# Patient Record
Sex: FEMALE | Race: WHITE | NOT HISPANIC OR LATINO | Employment: FULL TIME | URBAN - METROPOLITAN AREA
[De-identification: names, ages, dates, MRNs, and addresses within clinical notes are randomized per-mention and may not be internally consistent; named-entity substitution may affect disease eponyms.]

---

## 2017-01-09 ENCOUNTER — RADIANT APPOINTMENT (OUTPATIENT)
Dept: GENERAL RADIOLOGY | Facility: CLINIC | Age: 32
End: 2017-01-09
Attending: NURSE PRACTITIONER
Payer: COMMERCIAL

## 2017-01-09 ENCOUNTER — OFFICE VISIT (OUTPATIENT)
Dept: FAMILY MEDICINE | Facility: CLINIC | Age: 32
End: 2017-01-09
Payer: COMMERCIAL

## 2017-01-09 VITALS
TEMPERATURE: 98 F | DIASTOLIC BLOOD PRESSURE: 80 MMHG | BODY MASS INDEX: 39.92 KG/M2 | SYSTOLIC BLOOD PRESSURE: 123 MMHG | WEIGHT: 198 LBS | HEART RATE: 88 BPM | RESPIRATION RATE: 16 BRPM | HEIGHT: 59 IN

## 2017-01-09 DIAGNOSIS — S89.90XA KNEE INJURY: ICD-10-CM

## 2017-01-09 DIAGNOSIS — S89.92XA LEFT KNEE INJURY, INITIAL ENCOUNTER: Primary | ICD-10-CM

## 2017-01-09 PROCEDURE — 99203 OFFICE O/P NEW LOW 30 MIN: CPT | Performed by: NURSE PRACTITIONER

## 2017-01-09 PROCEDURE — 73562 X-RAY EXAM OF KNEE 3: CPT | Mod: LT

## 2017-01-09 NOTE — PROGRESS NOTES
"  SUBJECTIVE:                                                    Addie Fox is a 31 year old female who presents to clinic today for the following health issues:      Musculoskeletal problem/pain      Duration: today around noon     Description  Location: left knee     Intensity:  severe    Accompanying signs and symptoms: tingling    History  Previous similar problem: no   Previous evaluation:  none    Precipitating or alleviating factors:  Trauma or overuse: YES- fall  Aggravating factors include: none    Therapies tried and outcome: ice         Problem list and histories reviewed & adjusted, as indicated.    Additional history:  Foot ti  Slipped on ice getting out of her vehicle. Felt her left knee \"pop\".  Was able to go back to work to finish her day. Works at a computer.  Knee hurts and very painful to bear weight.  Foot tingles.   Here with         ROS:  C: NEGATIVE for fever, chills, change in weight  R: NEGATIVE for significant cough or SOB  CV: NEGATIVE for chest pain, palpitations or peripheral edema       OBJECTIVE:                                                    /80 mmHg  Pulse 88  Temp(Src) 98  F (36.7  C) (Tympanic)  Resp 16  Ht 4' 11\" (1.499 m)  Wt 198 lb (89.812 kg)  BMI 39.97 kg/m2  LMP 12/12/2016 (Exact Date)  Body mass index is 39.97 kg/(m^2).   GENERAL: healthy, alert, well nourished, well hydrated, sitting in wheel chair   RESP: lungs clear to auscultation - no rales, no rhonchi, no wheezes  CV: regular rates and rhythm, normal S1 S2, no S3 or S4 and no murmur, no click or rub   LEFT KNEE; Limited ROM,  No effusion. No lesions. Tender over medial knee   LEFT LEG: neurovascular signs intact     LEFT KNEE XRAY: negative to my interpretation      ASSESSMENT/PLAN:                                                        ICD-10-CM    1. Left knee injury, initial encounter S89.92XA        Discussed the injury. May expect some swelling to develop. Encourage elevation of knee and " icing. Has crutches at home to use if needed  Advil 400-600 mg every 4-6 hours for pain  Follow up if not improving as expected.      MARCUS Mccracken [facility]

## 2017-01-09 NOTE — MR AVS SNAPSHOT
"              After Visit Summary   2017    Addie Fox    MRN: [id number]           Patient Information     Date Of Birth          1985        Visit Information        Provider Department      2017 6:30 PM Lynn Gray APRN CNP [facility]        Today's Diagnoses     Knee injury    -  1        Follow-ups after your visit        Who to contact     If you have questions or need follow up information about today's clinic visit or your schedule please contact [facility] directly at [phone].  Normal or non-critical lab and imaging results will be communicated to you by [employer], letter or phone within 4 business days after the clinic has received the results. If you do not hear from us within 7 days, please contact the clinic through [employer] or phone. If you have a critical or abnormal lab result, we will notify you by phone as soon as possible.  Submit refill requests through [employer] or call your pharmacy and they will forward the refill request to us. Please allow 3 business days for your refill to be completed.          Additional Information About Your Visit        MyChart Information     [employer] lets you send messages to your doctor, view your test results, renew your prescriptions, schedule appointments and more. To sign up, go to [email or web address] . Click on \"Log in\" on the left side of the screen, which will take you to the Welcome page. Then click on \"Sign up Now\" on the right side of the page.     You will be asked to enter the access code listed below, as well as some personal information. Please follow the directions to create your username and password.     Your access code is: [email or web address]  Expires: 2017  7:05 PM     Your access code will  in 90 days. If you need help or a new code, please call your [facility] or [phone].        Care EveryWhere ID     This is your Care EveryWhere ID. This could be used by other organizations " "to access your [facility] medical records  [id number]        Your Vitals Were     Pulse Temperature Respirations Height BMI (Body Mass Index) Last Period    88 98  F (36.7  C) (Tympanic) 16 4' 11\" (1.499 m) 39.97 kg/m2 12/12/2016 (Exact Date)       Blood Pressure from Last 3 Encounters:   01/09/17 123/80    Weight from Last 3 Encounters:   01/09/17 198 lb (89.812 kg)              Today, you had the following     No orders found for display       Primary Care Provider    None Specified       No primary provider on file.        Thank you!     Thank you for choosing [facility]  for your care. Our goal is always to provide you with excellent care. Hearing back from our patients is one way we can continue to improve our services. Please take a few minutes to complete the written survey that you may receive in the mail after your visit with us. Thank you!             Your Updated Medication List - Protect others around you: Learn how to safely use, store and throw away your medicines at www.disposemymeds.org.      Notice  As of 1/9/2017  7:05 PM    You have not been prescribed any medications.      "

## 2017-01-09 NOTE — NURSING NOTE
"Chief Complaint   Patient presents with     Knee Pain       Initial /80 mmHg  Pulse 88  Temp(Src) 98  F (36.7  C) (Tympanic)  Resp 16  Ht 4' 11\" (1.499 m)  Wt 198 lb (89.812 kg)  BMI 39.97 kg/m2  LMP 12/12/2016 (Exact Date) Estimated body mass index is 39.97 kg/(m^2) as calculated from the following:    Height as of this encounter: 4' 11\" (1.499 m).    Weight as of this encounter: 198 lb (89.812 kg).  BP completed using cuff size: gilma Conway, CMA      "

## 2017-11-09 ENCOUNTER — ANESTHESIA - HEALTHEAST (OUTPATIENT)
Dept: SURGERY | Facility: HOSPITAL | Age: 32
End: 2017-11-09

## 2017-11-09 ASSESSMENT — MIFFLIN-ST. JEOR: SCORE: 1558.75

## 2017-11-10 ENCOUNTER — SURGERY - HEALTHEAST (OUTPATIENT)
Dept: SURGERY | Facility: HOSPITAL | Age: 32
End: 2017-11-10

## 2017-11-10 ASSESSMENT — MIFFLIN-ST. JEOR
SCORE: 1573.26
SCORE: 1567.82

## 2018-05-13 ENCOUNTER — APPOINTMENT (OUTPATIENT)
Dept: GENERAL RADIOLOGY | Facility: CLINIC | Age: 33
End: 2018-05-13
Attending: EMERGENCY MEDICINE
Payer: COMMERCIAL

## 2018-05-13 ENCOUNTER — HOSPITAL ENCOUNTER (EMERGENCY)
Facility: CLINIC | Age: 33
Discharge: HOME | End: 2018-05-13
Attending: EMERGENCY MEDICINE | Admitting: EMERGENCY MEDICINE
Payer: COMMERCIAL

## 2018-05-13 VITALS
HEART RATE: 84 BPM | DIASTOLIC BLOOD PRESSURE: 90 MMHG | WEIGHT: 200 LBS | TEMPERATURE: 98.4 F | RESPIRATION RATE: 16 BRPM | HEIGHT: 59 IN | SYSTOLIC BLOOD PRESSURE: 146 MMHG | BODY MASS INDEX: 40.32 KG/M2 | OXYGEN SATURATION: 98 %

## 2018-05-13 DIAGNOSIS — S80.11XA CONTUSION OF RIGHT LOWER EXTREMITY, INITIAL ENCOUNTER: ICD-10-CM

## 2018-05-13 DIAGNOSIS — S93.401A SPRAIN OF RIGHT ANKLE, UNSPECIFIED LIGAMENT, INITIAL ENCOUNTER: ICD-10-CM

## 2018-05-13 PROCEDURE — 25000132 ZZH RX MED GY IP 250 OP 250 PS 637: Performed by: EMERGENCY MEDICINE

## 2018-05-13 PROCEDURE — 73610 X-RAY EXAM OF ANKLE: CPT | Mod: RT

## 2018-05-13 PROCEDURE — 99283 EMERGENCY DEPT VISIT LOW MDM: CPT

## 2018-05-13 RX ORDER — IBUPROFEN 600 MG/1
600 TABLET, FILM COATED ORAL ONCE
Status: COMPLETED | OUTPATIENT
Start: 2018-05-13 | End: 2018-05-13

## 2018-05-13 RX ADMIN — IBUPROFEN 600 MG: 600 TABLET ORAL at 11:50

## 2018-05-13 ASSESSMENT — ENCOUNTER SYMPTOMS: CONSTITUTIONAL NEGATIVE: 1

## 2018-05-13 NOTE — ED PROVIDER NOTES
"  History     Chief Complaint:  Right Ankle Pain    HPI   Addie Fox is a 33 year old female who presents with right ankle pain. Today the patient was getting out of her car when she twisted her right ankle and fell. The ankle has been painful and non weightbearingShe also notes discomfort to right knee and right foot. . She denies head trauma or additional pain with the fall. She denies past medical problems and is otherwise normally healthy. She has not taken anything yet for pain.     Allergies:  Hazelnuts [Nuts]    Medications:    The patient is not currently taking any prescribed medications.    Past Medical History:    The patient denies any relevant past medical history.    Past Surgical History:    History reviewed. No pertinent past surgical history.    Family History:    The patient denies any relevant family medical history.    Social History:  The patient was accompanied to the ED by her .  Smoking Status: never  Smokeless Tobacco: never  Alcohol Use: no  The patient lives at home with her  and two kids.  She is an .     Marital Status:   [2]      Review of Systems   Constitutional: Negative.    Musculoskeletal:        Right ankle pain   All other systems reviewed and are negative.      Physical Exam   First Vitals:  BP: 146/90  Pulse: 84  Temp: 98.4  F (36.9  C)  Resp: 16  Height: 149.9 cm (4' 11\")  Weight: 90.7 kg (200 lb)  SpO2: 98 %    Physical Exam  General: Resting comfortably on the gurney  Head:  The scalp, face, and head appear normal  Neck:  Normal range of motion  MS:  Right leg maximum tenderness is right lateral ankle which has mild to moderate tenderness. Mild tenderness of   right knee and foot. CMS intact. Knee:     Full ROM. No effusion. Stable in all directions. Diffuse mild tenderness. Superficial abrasion.     Ankle: No medial tenderness. Some interosseal tenderness.   Neuro:  Speech is normal and fluent. Moves all 4 extremities. "   Psych:  Awake. Alert.  Normal affect.      Appropriate interactions    Emergency Department Course   Imaging:  Radiographic findings were communicated with the patient who voiced understanding of the findings.  XR Ankle Right G/E 3 views:   Three views right ankle. No fracture or dislocation. No significant soft tissue swelling. Mortise joint appears symmetric. As per radiology.     Procedures:  PROCEDURE: Splint Placement.    Air splint was applied to the right lower extremity and after placement I checked and adjusted the fit to ensure proper positioning.  The patient was more comfortable with the splint in place.  Sensation and circulation are intact after splint placement.    Interventions:  1150 Advil, 600 mg, Oral    Emergency Department Course:  Nursing notes and vitals reviewed. I performed an exam of the patient as documented above.     Medicine administered as documented above.     The patient was sent for an X-ray of the right ankle while in the emergency department, findings above.     1218 I rechecked the patient and discussed the results of her workup thus far.     An air splint was placed, see details above. The patient has an ace wrap and crutches for assistance.     Findings and plan explained to the Patient. Patient discharged home with instructions regarding supportive care, medications, and reasons to return. The importance of close follow-up was reviewed.     I personally reviewed the imaging results with the Patient and answered all related questions prior to discharge.       Impression & Plan    Medical Decision Making:  The patient had an inversion injury to her right ankle and fell to the ground.  She comes in complaining of pain from her right knee to her right foot although mainly localized in the right lateral ankle.  She has tenderness and swelling laterally and mildly interosseous.  Her ankle x-ray is negative.  She has some mild tenderness elsewhere in her leg including her knee but  has no significant tenderness or localized tenderness so I don't think she needs further imaging.  I suspect this is more bruised and can be watched over time and follow-up if not getting better.  She can weight-bear as tolerated with her air splint.  She will follow-up with her primary as needed.    Diagnosis:    ICD-10-CM    1. Sprain of right ankle, unspecified ligament, initial encounter S93.401A    2. Contusion of right lower extremity, initial encounter S80.11XA      Disposition:  discharged to home    IFeliz, am serving as a scribe on 5/13/2018 at 12:39 PM to personally document services performed by Nan Cedeno MD based on my observations and the provider's statements to me.     Feliz Miller  5/13/2018    EMERGENCY DEPARTMENT       Nan Cedeno MD  05/13/18 [id number]

## 2018-05-13 NOTE — ED AVS SNAPSHOT
Emergency Department    [address] [facility] [address]    Phone:  [phone]    Fax:  [phone]                                       Addie Fox   MRN: [id number]    Department:   Emergency Department   Date of Visit:  5/13/2018           Patient Information     Date Of Birth          1985        Your diagnoses for this visit were:     Sprain of right ankle, unspecified ligament, initial encounter     Contusion of right lower extremity, initial encounter        You were seen by Nan Cedeno MD.      Follow-up Information     Follow up with Delilah Tang MD.    Specialty:  Family Practice    Why:  As needed    Contact information:    [facility]  [address] [id number] [phone]          Discharge Instructions       Discharge Instructions  Ankle Sprain    An ankle sprain is a stretching or tearing of a ligament around your ankle joint. In most cases, we recommend resting the ankle for about 3 days, followed by return to activity. Some severe sprains need longer periods of rest, or can require a cast or boot to immobilize them.    Generally, every Emergency Department visit should have a follow-up clinic visit with either a primary or a specialty clinic/provider. Please follow-up as instructed by your emergency provider today.    Return to the Emergency Department if:    Your pain is much worse, or if there is pain in a new area.    Your foot or leg becomes pale, cool, blue, or numb or tingling.    There is anything concerning to you about how your ankle looks.    Any splint or device is feeling too tight, causing pain, or rubbing into your skin.    Follow-up with your provider:    As recommended by your emergency provider.    If your ankle is not back to normal within about 1 week.    If you are involved in significant athletic activities.        Treatment:    Apply ice your injured area for 15 minutes at a time, at least 3 times a  day for the first 1-2 days. Use a cloth between the ice bag and your skin to prevent frostbite.     Do not sleep with an ice pack or heating pad on, since this can cause burns or skin injury.    Raise the injured area above the level of your heart as much as possible in the first 1-2 days.    Pain medications -- You may take a pain medication such as Tylenol  (acetaminophen), Advil , Nuprin  (ibuprofen) or Aleve  (naproxen).    Splint. We often give a stirrup-shaped ankle splint to support your ankle and prevent it from turning again. Wear this all the time for the first 3-5 days, and then as directed by your provider.    Crutches. If you cannot put weight on the ankle without a lot of pain, we recommend crutches. You can put as much weight on the ankle as possible without severe pain.     Compression. An elastic bandage (Ace  wrap) can help with pain and swelling. Remove this at least twice a day, and leave it off for several hours if you develop swelling of the foot.     Exercises.  Movements, like rotating the foot in circles, should be started when swelling improves.   If you were given a prescription for medicine here today, be sure to read all of the information (including the package insert) that comes with your prescription.  This will include important information about the medicine, its side effects, and any warnings that you need to know about.  The pharmacist who fills the prescription can provide more information and answer questions you may have about the medicine.  If you have questions or concerns that the pharmacist cannot address, please call or return to the Emergency Department.  Remember that you can always come back to the Emergency Department if you are not able to see your regular provider in the amount of time listed above, if you get any new symptoms, or if there is anything that worries you.    24 Hour Appointment Hotline       To make an appointment at any [facility], call  [phone] ([phone]). If you don't have a family doctor or clinic, we will help you find one. [facility] clinics are conveniently located to serve the needs of you and your family.             Review of your medicines      Notice     You have not been prescribed any medications.            Procedures and tests performed during your visit     XR Ankle Right G/E 3 Views      Orders Needing Specimen Collection     None      Pending Results     No orders found from 5/11/2018 to 5/14/2018.            Pending Culture Results     No orders found from 5/11/2018 to 5/14/2018.            Pending Results Instructions     If you had any lab results that were not finalized at the time of your Discharge, you can call the ED Lab Result RN at [phone]. You will be contacted by this team for any positive Lab results or changes in treatment. The nurses are available 7 days a week from 10A to 6:30P.  You can leave a message 24 hours per day and they will return your call.        Test Results From Your Hospital Stay        5/13/2018 12:23 PM      Narrative     ANKLE RIGHT THREE OR MORE VIEWS May 13, 2018 12:05 PM     HISTORY: Fell, pain.         Impression     IMPRESSION: Three views right ankle. No fracture or dislocation. No  significant soft tissue swelling. Mortise joint appears symmetric.    ANTONIO KEENAN MD                Clinical Quality Measure: Blood Pressure Screening     Your blood pressure was checked while you were in the emergency department today. The last reading we obtained was  BP: 146/90 . Please read the guidelines below about what these numbers mean and what you should do about them.  If your systolic blood pressure (the top number) is less than 120 and your diastolic blood pressure (the bottom number) is less than 80, then your blood pressure is normal. There is nothing more that you need to do about it.  If your systolic blood pressure (the top number) is 120-139 or your diastolic blood pressure (the  "bottom number) is 80-89, your blood pressure may be higher than it should be. You should have your blood pressure rechecked within a year by a primary care provider.  If your systolic blood pressure (the top number) is 140 or greater or your diastolic blood pressure (the bottom number) is 90 or greater, you may have high blood pressure. High blood pressure is treatable, but if left untreated over time it can put you at risk for heart attack, stroke, or kidney failure. You should have your blood pressure rechecked by a primary care provider within the next 4 weeks.  If your provider in the emergency department today gave you specific instructions to follow-up with your doctor or provider even sooner than that, you should follow that instruction and not wait for up to 4 weeks for your follow-up visit.        Thank you for choosing [facility]       Thank you for choosing [facility] for your care. Our goal is always to provide you with excellent care. Hearing back from our patients is one way we can continue to improve our services. Please take a few minutes to complete the written survey that you may receive in the mail after you visit with us. Thank you!        [employer] Information     [employer] lets you send messages to your doctor, view your test results, renew your prescriptions, schedule appointments and more. To sign up, go to [email or web address] . Click on \"Log in\" on the left side of the screen, which will take you to the Welcome page. Then click on \"Sign up Now\" on the right side of the page.     You will be asked to enter the access code listed below, as well as some personal information. Please follow the directions to create your username and password.     Your access code is: [email or web address]  Expires: 2018 12:46 PM     Your access code will  in 90 days. If you need help or a new code, please call your [facility] clinic or [phone].        Care EveryWhere ID     This is your Care EveryWhere ID. This " could be used by other organizations to access your [facility] medical records  [id number]        Equal Access to Services     AMILCAR ULLOA : Hadii krista Rodriguez, alexis titus, catrachita peña, july myers. So [facility] [phone].    ATENCIÓN: Si habla español, tiene a kyle disposición servicios gratuitos de asistencia lingüística. Llame al [phone].    We comply with applicable federal civil rights laws and [place] laws. We do not discriminate on the basis of race, color, national origin, age, disability, sex, sexual orientation, or gender identity.            After Visit Summary       This is your record. Keep this with you and show to your community pharmacist(s) and doctor(s) at your next visit.

## 2018-05-13 NOTE — DISCHARGE INSTRUCTIONS
Discharge Instructions  Ankle Sprain    An ankle sprain is a stretching or tearing of a ligament around your ankle joint. In most cases, we recommend resting the ankle for about 3 days, followed by return to activity. Some severe sprains need longer periods of rest, or can require a cast or boot to immobilize them.    Generally, every Emergency Department visit should have a follow-up clinic visit with either a primary or a specialty clinic/provider. Please follow-up as instructed by your emergency provider today.    Return to the Emergency Department if:    Your pain is much worse, or if there is pain in a new area.    Your foot or leg becomes pale, cool, blue, or numb or tingling.    There is anything concerning to you about how your ankle looks.    Any splint or device is feeling too tight, causing pain, or rubbing into your skin.    Follow-up with your provider:    As recommended by your emergency provider.    If your ankle is not back to normal within about 1 week.    If you are involved in significant athletic activities.        Treatment:    Apply ice your injured area for 15 minutes at a time, at least 3 times a day for the first 1-2 days. Use a cloth between the ice bag and your skin to prevent frostbite.     Do not sleep with an ice pack or heating pad on, since this can cause burns or skin injury.    Raise the injured area above the level of your heart as much as possible in the first 1-2 days.    Pain medications -- You may take a pain medication such as Tylenol  (acetaminophen), Advil , Nuprin  (ibuprofen) or Aleve  (naproxen).    Splint. We often give a stirrup-shaped ankle splint to support your ankle and prevent it from turning again. Wear this all the time for the first 3-5 days, and then as directed by your provider.    Crutches. If you cannot put weight on the ankle without a lot of pain, we recommend crutches. You can put as much weight on the ankle as possible without severe pain.      Compression. An elastic bandage (Ace  wrap) can help with pain and swelling. Remove this at least twice a day, and leave it off for several hours if you develop swelling of the foot.     Exercises.  Movements, like rotating the foot in circles, should be started when swelling improves.   If you were given a prescription for medicine here today, be sure to read all of the information (including the package insert) that comes with your prescription.  This will include important information about the medicine, its side effects, and any warnings that you need to know about.  The pharmacist who fills the prescription can provide more information and answer questions you may have about the medicine.  If you have questions or concerns that the pharmacist cannot address, please call or return to the Emergency Department.  Remember that you can always come back to the Emergency Department if you are not able to see your regular provider in the amount of time listed above, if you get any new symptoms, or if there is anything that worries you.

## 2018-05-13 NOTE — ED AVS SNAPSHOT
Emergency Department    [address] [place] [address]    Phone:  [phone]    Fax:  [phone]                                       Addie Fox   MRN: [id number]    Department:   Emergency Department   Date of Visit:  5/13/2018           After Visit Summary Signature Page     I have received my discharge instructions, and my questions have been answered. I have discussed any challenges I see with this plan with the nurse or doctor.    ..........................................................................................................................................  Patient/Patient Representative Signature      ..........................................................................................................................................  Patient Representative Print Name and Relationship to Patient    ..................................................               ................................................  Date                                            Time    ..........................................................................................................................................  Reviewed by Signature/Title    ...................................................              ..............................................  Date                                                            Time

## 2020-02-03 ENCOUNTER — RECORDS - HEALTHEAST (OUTPATIENT)
Dept: LAB | Facility: CLINIC | Age: 35
End: 2020-02-03

## 2020-02-03 LAB
ANION GAP SERPL CALCULATED.3IONS-SCNC: 14 MMOL/L (ref 5–18)
BUN SERPL-MCNC: 12 MG/DL (ref 8–22)
CALCIUM SERPL-MCNC: 9.4 MG/DL (ref 8.5–10.5)
CHLORIDE BLD-SCNC: 97 MMOL/L (ref 98–107)
CHOLEST SERPL-MCNC: 245 MG/DL
CO2 SERPL-SCNC: 29 MMOL/L (ref 22–31)
CREAT SERPL-MCNC: 0.64 MG/DL (ref 0.6–1.1)
FASTING STATUS PATIENT QL REPORTED: ABNORMAL
GFR SERPL CREATININE-BSD FRML MDRD: >60 ML/MIN/1.73M2
GLUCOSE BLD-MCNC: 136 MG/DL (ref 70–125)
HDLC SERPL-MCNC: 57 MG/DL
LDLC SERPL CALC-MCNC: 148 MG/DL
POTASSIUM BLD-SCNC: 3.2 MMOL/L (ref 3.5–5)
SODIUM SERPL-SCNC: 140 MMOL/L (ref 136–145)
TRIGL SERPL-MCNC: 201 MG/DL
TSH SERPL DL<=0.005 MIU/L-ACNC: 1.05 UIU/ML (ref 0.3–5)

## 2021-02-05 ENCOUNTER — RECORDS - HEALTHEAST (OUTPATIENT)
Dept: LAB | Facility: CLINIC | Age: 36
End: 2021-02-05

## 2021-02-08 LAB — BACTERIA SPEC CULT: ABNORMAL

## 2021-04-21 ENCOUNTER — RECORDS - HEALTHEAST (OUTPATIENT)
Dept: LAB | Facility: CLINIC | Age: 36
End: 2021-04-21

## 2021-04-21 LAB
ANION GAP SERPL CALCULATED.3IONS-SCNC: 11 MMOL/L (ref 5–18)
BUN SERPL-MCNC: 10 MG/DL (ref 8–22)
CALCIUM SERPL-MCNC: 8.8 MG/DL (ref 8.5–10.5)
CHLORIDE BLD-SCNC: 104 MMOL/L (ref 98–107)
CHOLEST SERPL-MCNC: 258 MG/DL
CO2 SERPL-SCNC: 27 MMOL/L (ref 22–31)
CREAT SERPL-MCNC: 0.66 MG/DL (ref 0.6–1.1)
FASTING STATUS PATIENT QL REPORTED: ABNORMAL
GFR SERPL CREATININE-BSD FRML MDRD: >60 ML/MIN/1.73M2
GLUCOSE BLD-MCNC: 98 MG/DL (ref 70–125)
HDLC SERPL-MCNC: 37 MG/DL
LDLC SERPL CALC-MCNC: 155 MG/DL
POTASSIUM BLD-SCNC: 3.9 MMOL/L (ref 3.5–5)
SODIUM SERPL-SCNC: 142 MMOL/L (ref 136–145)
TRIGL SERPL-MCNC: 328 MG/DL

## 2021-04-26 ENCOUNTER — SURGERY - HEALTHEAST (OUTPATIENT)
Dept: SURGERY | Facility: CLINIC | Age: 36
End: 2021-04-26

## 2021-04-26 ENCOUNTER — OFFICE VISIT - HEALTHEAST (OUTPATIENT)
Dept: SURGERY | Facility: CLINIC | Age: 36
End: 2021-04-26

## 2021-04-26 DIAGNOSIS — D17.30 LIPOMA OF SKIN AND SUBCUTANEOUS TISSUE: ICD-10-CM

## 2021-04-26 ASSESSMENT — MIFFLIN-ST. JEOR: SCORE: 1481.07

## 2021-04-27 ENCOUNTER — AMBULATORY - HEALTHEAST (OUTPATIENT)
Dept: SURGERY | Facility: AMBULATORY SURGERY CENTER | Age: 36
End: 2021-04-27

## 2021-04-27 ENCOUNTER — COMMUNICATION - HEALTHEAST (OUTPATIENT)
Dept: OTOLARYNGOLOGY | Facility: CLINIC | Age: 36
End: 2021-04-27

## 2021-04-27 DIAGNOSIS — Z11.59 ENCOUNTER FOR SCREENING FOR OTHER VIRAL DISEASES: ICD-10-CM

## 2021-05-03 ENCOUNTER — COMMUNICATION - HEALTHEAST (OUTPATIENT)
Dept: SURGERY | Facility: CLINIC | Age: 36
End: 2021-05-03
Payer: COMMERCIAL

## 2021-05-03 DIAGNOSIS — Z11.59 ENCOUNTER FOR SCREENING FOR OTHER VIRAL DISEASES: Primary | ICD-10-CM

## 2021-05-10 DIAGNOSIS — Z11.59 ENCOUNTER FOR SCREENING FOR OTHER VIRAL DISEASES: ICD-10-CM

## 2021-05-10 LAB
LABORATORY COMMENT REPORT: NORMAL
SARS-COV-2 RNA RESP QL NAA+PROBE: NEGATIVE
SARS-COV-2 RNA RESP QL NAA+PROBE: NORMAL
SPECIMEN SOURCE: NORMAL
SPECIMEN SOURCE: NORMAL

## 2021-05-10 PROCEDURE — U0003 INFECTIOUS AGENT DETECTION BY NUCLEIC ACID (DNA OR RNA); SEVERE ACUTE RESPIRATORY SYNDROME CORONAVIRUS 2 (SARS-COV-2) (CORONAVIRUS DISEASE [COVID-19]), AMPLIFIED PROBE TECHNIQUE, MAKING USE OF HIGH THROUGHPUT TECHNOLOGIES AS DESCRIBED BY CMS-2020-01-R: HCPCS | Performed by: SURGERY

## 2021-05-10 PROCEDURE — U0005 INFEC AGEN DETEC AMPLI PROBE: HCPCS | Performed by: SURGERY

## 2021-05-11 ENCOUNTER — RECORDS - HEALTHEAST (OUTPATIENT)
Dept: ADMINISTRATIVE | Facility: OTHER | Age: 36
End: 2021-05-11

## 2021-05-11 ASSESSMENT — MIFFLIN-ST. JEOR
SCORE: 1444.78
SCORE: 1444.78

## 2021-05-12 ENCOUNTER — COMMUNICATION - HEALTHEAST (OUTPATIENT)
Dept: SCHEDULING | Facility: CLINIC | Age: 36
End: 2021-05-12

## 2021-05-13 ENCOUNTER — ANESTHESIA - HEALTHEAST (OUTPATIENT)
Dept: SURGERY | Facility: AMBULATORY SURGERY CENTER | Age: 36
End: 2021-05-13

## 2021-05-14 ENCOUNTER — RECORDS - HEALTHEAST (OUTPATIENT)
Dept: ADMINISTRATIVE | Facility: OTHER | Age: 36
End: 2021-05-14

## 2021-05-14 ENCOUNTER — HOSPITAL ENCOUNTER (OUTPATIENT)
Dept: SURGERY | Facility: AMBULATORY SURGERY CENTER | Age: 36
Discharge: HOME | End: 2021-05-14
Attending: SURGERY | Admitting: SURGERY
Payer: COMMERCIAL

## 2021-05-14 ENCOUNTER — SURGERY - HEALTHEAST (OUTPATIENT)
Dept: SURGERY | Facility: AMBULATORY SURGERY CENTER | Age: 36
End: 2021-05-14
Payer: COMMERCIAL

## 2021-05-14 DIAGNOSIS — D17.30 LIPOMA OF SKIN AND SUBCUTANEOUS TISSUE: ICD-10-CM

## 2021-05-14 ASSESSMENT — MIFFLIN-ST. JEOR
SCORE: 1444.78
SCORE: 1444.78

## 2021-05-27 VITALS
HEIGHT: 59 IN | WEIGHT: 185 LBS | HEIGHT: 59 IN | BODY MASS INDEX: 37.37 KG/M2 | WEIGHT: 185 LBS | BODY MASS INDEX: 37.37 KG/M2 | BODY MASS INDEX: 37.29 KG/M2

## 2021-05-27 VITALS — BODY MASS INDEX: 40.4 KG/M2 | HEIGHT: 59 IN

## 2021-05-27 VITALS — BODY MASS INDEX: 37.37 KG/M2 | WEIGHT: 185 LBS

## 2021-05-31 VITALS — WEIGHT: 214.2 LBS | BODY MASS INDEX: 43.18 KG/M2 | HEIGHT: 59 IN

## 2021-06-01 ENCOUNTER — RECORDS - HEALTHEAST (OUTPATIENT)
Dept: ADMINISTRATIVE | Facility: CLINIC | Age: 36
End: 2021-06-01

## 2021-06-02 ENCOUNTER — OFFICE VISIT - HEALTHEAST (OUTPATIENT)
Dept: SURGERY | Facility: CLINIC | Age: 36
End: 2021-06-02

## 2021-06-02 DIAGNOSIS — Z48.89 POSTOPERATIVE VISIT: ICD-10-CM

## 2021-06-11 ENCOUNTER — OFFICE VISIT (OUTPATIENT)
Dept: URGENT CARE | Facility: URGENT CARE | Age: 36
End: 2021-06-11
Payer: COMMERCIAL

## 2021-06-11 VITALS
RESPIRATION RATE: 18 BRPM | HEIGHT: 59 IN | DIASTOLIC BLOOD PRESSURE: 74 MMHG | OXYGEN SATURATION: 96 % | TEMPERATURE: 97.9 F | HEART RATE: 76 BPM | SYSTOLIC BLOOD PRESSURE: 115 MMHG | BODY MASS INDEX: 35.48 KG/M2 | WEIGHT: 176 LBS

## 2021-06-11 DIAGNOSIS — M54.50 ACUTE LEFT-SIDED LOW BACK PAIN WITHOUT SCIATICA: ICD-10-CM

## 2021-06-11 DIAGNOSIS — R82.90 NONSPECIFIC FINDING ON EXAMINATION OF URINE: ICD-10-CM

## 2021-06-11 DIAGNOSIS — N10 PYELONEPHRITIS, ACUTE: Primary | ICD-10-CM

## 2021-06-11 DIAGNOSIS — R10.32 LLQ ABDOMINAL PAIN: ICD-10-CM

## 2021-06-11 LAB
ALBUMIN UR-MCNC: 30 MG/DL
APPEARANCE UR: ABNORMAL
BACTERIA #/AREA URNS HPF: ABNORMAL /HPF
BILIRUB UR QL STRIP: NEGATIVE
COLOR UR AUTO: YELLOW
GLUCOSE UR STRIP-MCNC: NEGATIVE MG/DL
HGB UR QL STRIP: ABNORMAL
KETONES UR STRIP-MCNC: NEGATIVE MG/DL
LEUKOCYTE ESTERASE UR QL STRIP: ABNORMAL
NITRATE UR QL: NEGATIVE
PH UR STRIP: 5.5 PH (ref 5–7)
RBC #/AREA URNS AUTO: ABNORMAL /HPF
SOURCE: ABNORMAL
SP GR UR STRIP: <=1.005 (ref 1–1.03)
UROBILINOGEN UR STRIP-ACNC: 0.2 EU/DL (ref 0.2–1)
WBC #/AREA URNS AUTO: >100 /HPF

## 2021-06-11 PROCEDURE — 87086 URINE CULTURE/COLONY COUNT: CPT | Performed by: FAMILY MEDICINE

## 2021-06-11 PROCEDURE — 96372 THER/PROPH/DIAG INJ SC/IM: CPT | Performed by: FAMILY MEDICINE

## 2021-06-11 PROCEDURE — 99204 OFFICE O/P NEW MOD 45 MIN: CPT | Mod: 25 | Performed by: FAMILY MEDICINE

## 2021-06-11 PROCEDURE — 81001 URINALYSIS AUTO W/SCOPE: CPT | Performed by: PHYSICIAN ASSISTANT

## 2021-06-11 RX ORDER — ESTRADIOL 2 MG/1
TABLET ORAL
COMMUNITY
Start: 2021-03-24

## 2021-06-11 RX ORDER — CHLORTHALIDONE 25 MG/1
TABLET ORAL
COMMUNITY
Start: 2021-04-16

## 2021-06-11 RX ORDER — ESCITALOPRAM OXALATE 10 MG/1
TABLET ORAL
COMMUNITY
Start: 2020-05-18

## 2021-06-11 RX ORDER — BUSPIRONE HYDROCHLORIDE 10 MG/1
TABLET ORAL
COMMUNITY
Start: 2021-04-21

## 2021-06-11 RX ORDER — HYDROXYZINE PAMOATE 25 MG/1
CAPSULE ORAL
COMMUNITY
Start: 2020-08-14

## 2021-06-11 RX ORDER — LORAZEPAM 0.5 MG/1
TABLET ORAL
COMMUNITY
Start: 2021-02-11

## 2021-06-11 RX ORDER — CEFTRIAXONE SODIUM 1 G
1 VIAL (EA) INJECTION ONCE
Status: COMPLETED | OUTPATIENT
Start: 2021-06-11 | End: 2021-06-11

## 2021-06-11 RX ORDER — SULFAMETHOXAZOLE/TRIMETHOPRIM 800-160 MG
1 TABLET ORAL 2 TIMES DAILY
Qty: 20 TABLET | Refills: 0 | Status: SHIPPED | OUTPATIENT
Start: 2021-06-11 | End: 2021-06-21

## 2021-06-11 RX ORDER — MIRTAZAPINE 15 MG/1
TABLET, FILM COATED ORAL
COMMUNITY
Start: 2021-04-25

## 2021-06-11 RX ADMIN — Medication 1 G: at 11:43

## 2021-06-11 ASSESSMENT — MIFFLIN-ST. JEOR: SCORE: 1403.96

## 2021-06-11 NOTE — PROGRESS NOTES
"SUBJECTIVE: Addie Fox is a 36 year old female who  presents today for a possible UTI.   Symptoms of abd pain into left back have been going on for day(s).    Hematuria no.  still presentand moderate.    There is no history of fever, chills, nausea or vomiting.   This patient does have a history of urinary tract infections.   Patient denies long duration and flank pain    No past medical history on file.  Allergies   Allergen Reactions     Corylus Unknown     Dairy Digestive Unknown     Lactose Other (See Comments)     Gas - no diarrhea     Nuts Unknown and Hives     Peanut Oil Unknown     Social History     Tobacco Use     Smoking status: Never Smoker     Smokeless tobacco: Never Used   Substance Use Topics     Alcohol use: No     Alcohol/week: 0.0 standard drinks       ROS: CONSTITUTIONAL:NEGATIVE for fever, chills, change in weight    OBJECTIVE:  /74   Pulse 76   Temp 97.9  F (36.6  C)   Resp 18   Ht 1.499 m (4' 11\")   Wt 79.8 kg (176 lb)   SpO2 96%   BMI 35.55 kg/m    NAD  Positive left Flank/abd pain      ICD-10-CM    1. Pyelonephritis, acute  N10 cefTRIAXone (ROCEPHIN) injection 1 g     sulfamethoxazole-trimethoprim (BACTRIM DS) 800-160 MG tablet   2. LLQ abdominal pain  R10.32 UA reflex to Microscopic and Culture     Urine Microscopic   3. Acute left-sided low back pain without sciatica  M54.5    4. Nonspecific finding on examination of urine  R82.90 Urine Culture Aerobic Bacterial       Drink plenty of fluids.  Prevention and treatment of UTI's discussed.Signs and symptoms of pyelonephritis mentioned.  Follow up with primary care physician if not improving    "

## 2021-06-12 LAB
BACTERIA SPEC CULT: NORMAL
Lab: NORMAL
SPECIMEN SOURCE: NORMAL

## 2021-06-14 NOTE — ANESTHESIA POSTPROCEDURE EVALUATION
Patient: Addie Fox  ROBOTIC TOTAL LAPAROSCOPIC HYSTERECTOMY BILATERAL SALPINGO-OOPHORECTOMY, LYSIS OF ADHESIONS  Anesthesia type: general    Patient location: PACU  Last vitals:   Vitals:    11/10/17 0940   BP: 155/60   Pulse: 82   Resp: 13   Temp:    SpO2: 96%     Post vital signs: stable  Level of consciousness: awake and responds to simple questions  Post-anesthesia pain: pain controlled  Post-anesthesia nausea and vomiting: no  Pulmonary: unassisted, return to baseline  Cardiovascular: stable and blood pressure at baseline  Hydration: adequate  Anesthetic events: no    QCDR Measures:  ASA# 11 - Alea-op Cardiac Arrest: ASA11B - Patient did NOT experience unanticipated cardiac arrest  ASA# 12 - Alea-op Mortality Rate: ASA12B - Patient did NOT die  ASA# 13 - PACU Re-Intubation Rate: ASA13B - Patient did NOT require a new airway mgmt  ASA# 10 - Composite Anes Safety: ASA10A - No serious adverse event    Additional Notes:

## 2021-06-14 NOTE — ANESTHESIA CARE TRANSFER NOTE
Last vitals:   Vitals:    11/10/17 0904   BP: 134/69   Pulse: (!) 125   Resp: 10   Temp: 36.8  C (98.2  F)   SpO2: 93%     Patient's level of consciousness is drowsy  Spontaneous respirations: yes  Maintains airway independently: yes  Dentition unchanged: yes  Oropharynx: oropharynx clear of all foreign objects    QCDR Measures:  ASA# 20 - Surgical Safety Checklist: WHO surgical safety checklist completed prior to induction  PQRS# 430 - Adult PONV Prevention: 4558F - Pt received => 2 anti-emetic agents (different classes) preop & intraop  ASA# 8 - Peds PONV Prevention: NA - Not pediatric patient, not GA or 2 or more risk factors NOT present  PQRS# 424 - Alea-op Temp Management: 4559F - At least one body temp DOCUMENTED => 35.5C or 95.9F within required timeframe  PQRS# 426 - PACU Transfer Protocol: - Transfer of care checklist used  ASA# 14 - Acute Post-op Pain: ASA14B - Patient did NOT experience pain >= 7 out of 10

## 2021-06-14 NOTE — ANESTHESIA PREPROCEDURE EVALUATION
Anesthesia Evaluation      Patient summary reviewed   No history of anesthetic complications     Airway   Mallampati: II  Neck ROM: full   Pulmonary - negative ROS and normal exam                          Cardiovascular - negative ROS and normal exam  Rhythm: regular  Rate: normal,         Neuro/Psych    (+) depression,     Comments: Lyme disease      Endo/Other    (+) diabetes mellitus, obesity,      GI/Hepatic/Renal - negative ROS           Dental - normal exam               Lab Results   Component Value Date    WBC 8.9 03/03/2016    HGB 13.4 11/10/2017    HCT 33.3 (L) 03/03/2016    MCV 87 03/03/2016     03/07/2016                Anesthesia Plan  Planned anesthetic: general endotracheal    ASA 2   Induction: intravenous   Anesthetic plan and risks discussed with: patient  Anesthesia plan special considerations: antiemetics,   Post-op plan: routine recovery

## 2021-06-16 PROBLEM — D17.30 LIPOMA OF SKIN AND SUBCUTANEOUS TISSUE: Status: ACTIVE | Noted: 2021-04-27

## 2021-06-17 NOTE — PROGRESS NOTES
HPI: Addie Fox is a 36 y.o. female referred to see me by Delilah Tang MD for a lump in the left upper abdominal/chest region.  She notes  a several year history of the lump and states that it is progressively enlarging and causing irritation when she wears her close She denies any nausea, vomiting, fevers, chills or any other symptoms at present.       Allergies:Hazelnut, Lactose, and Peanut oil    Past Medical History:   Diagnosis Date     Depression      Diabetes mellitus (H)     gestational     Lyme disease      Menorrhagia      Polycystic ovarian syndrome        Past Surgical History:   Procedure Laterality Date      SECTION        SECTION, LOW TRANSVERSE       EXPLORATORY LAPAROTOMY       LAPAROSCOPIC HYSTERECTOMY Bilateral 11/10/2017    Procedure: ROBOTIC TOTAL LAPAROSCOPIC HYSTERECTOMY BILATERAL SALPINGO-OOPHORECTOMY, LYSIS OF ADHESIONS;  Surgeon: Yossi Lyle MD;  Location: [facility];  Service:      LAPAROSCOPIC OVARIAN CYSTECTOMY Bilateral ,      OVARIAN CYST SURGERY       WISDOM TOOTH EXTRACTION         CURRENT MEDS:    Current Outpatient Medications:      acetaminophen (TYLENOL) 500 MG tablet, Take 1,000 mg by mouth 2 (two) times a day as needed for pain., Disp: , Rfl:      busPIRone (BUSPAR) 10 MG tablet, , Disp: , Rfl:      chlorthalidone (HYGROTEN) 25 MG tablet, , Disp: , Rfl:      escitalopram oxalate (LEXAPRO) 10 MG tablet, , Disp: , Rfl:      escitalopram oxalate (LEXAPRO) 20 MG tablet, , Disp: , Rfl:      estradioL (ESTRACE) 2 MG tablet, , Disp: , Rfl:      hydrOXYzine pamoate (VISTARIL) 25 MG capsule, TAKE ONE CAPSULE BY MOUTH THREE TIMES DAILY FOR ANXIETY, Disp: , Rfl:      LORazepam (ATIVAN) 0.5 MG tablet, , Disp: , Rfl:      mirtazapine (REMERON) 15 MG tablet, , Disp: , Rfl:     Family History   Problem Relation Age of Onset     Heart disease Mother         reports that she is a non-smoker but has been exposed to tobacco smoke. She has never  "used smokeless tobacco. She reports that she does not drink alcohol or use drugs.    Review of Systems:  The 12 system review is within normal limits except for as mentioned above.  General ROS: No complaints or constitutional symptoms  Ophthalmic ROS: No complaints of visual changes  Skin: As per HPI  Endocrine: No complaints or symptoms  Hematologic/Lymphatic: No symptoms or complaints  Psychiatric: No symptoms or complaints  Respiratory ROS: no cough, shortness of breath, or wheezing  Cardiovascular ROS: no chest pain or dyspnea on exertion  Gastrointestinal ROS: No complaints of pain or other symptoms  Genito-Urinary ROS: no dysuria, trouble voiding, or hematuria  Musculoskeletal ROS: no joint or muscle pain  Neurological ROS: no TIA or stroke symptoms      EXAM:  Ht 4' 11\" (1.499 m)   Wt 193 lb (87.5 kg)   BMI 38.98 kg/m    GENERAL: Well developed female, No acute distress, pleasant and conversant   EYES: Pupils equal, round and reactive, no scleral icterus  CHEST-large 10 x 8 cm lipomatous mass at the left lower costochondral margin  ABDOMEN: Soft, non tender, no masses  SKIN: Pink, warm and dry  NEURO:No focal deficits, ambulatory  MUSCULOSKELETAL:No obvious deformities, no swelling, normal appearing      LABS:  Lab Results   Component Value Date    WBC 8.9 03/03/2016    HGB 11.8 (L) 11/11/2017    HCT 33.3 (L) 03/03/2016    MCV 87 03/03/2016     03/07/2016     INR/Prothrombin Time  Results from last 7 days   Lab Units 04/21/21  0903   LN-SODIUM mmol/L 142   LN-POTASSIUM mmol/L 3.9   LN-CHLORIDE mmol/L 104   LN-CO2 mmol/L 27   LN-BLOOD UREA NITROGEN mg/dL 10   LN-CREATININE mg/dL 0.66   LN-CALCIUM mg/dL 8.8     Lab Results   Component Value Date    ALT 11 03/03/2016    AST 12 03/03/2016           Assessment/Plan:   Addie Fox is a 36 y.o. female with signs and symptoms consistent with a likely lipoma.  I have explained the pathophysiology of lipomas in detail as well as the surgical versus " non-operative management strategies.      The risks of surgery were discussed in detail which include, but are not limited to, bleeding, infection, blood clots, stroke, heart attack and death.  Additionally, the risks of non operative management were discussed which include, but are not limited to,enlargin of the mass, infection and pain.      She understands everything which was discussed and has consented to proceed with an excision of the mass.  We will schedule surgery accordingly.       Jerry Rodríguez D.O. [facility]  [phone]  [facility] Department of Surgery

## 2021-06-17 NOTE — ANESTHESIA CARE TRANSFER NOTE
Last vitals:   Vitals:    05/14/21 1313   BP: (P) 121/60   Pulse: (P) 80   Resp: (P) 16   Temp: (P) 36.3  C (97.3  F)   SpO2: (P) 95%     Patient's level of consciousness is drowsy  Spontaneous respirations: yes  Maintains airway independently: yes  Dentition unchanged: yes  Oropharynx: oropharynx clear of all foreign objects    QCDR Measures:  ASA# 20 - Surgical Safety Checklist: WHO surgical safety checklist completed prior to induction    PQRS# 430 - Adult PONV Prevention: 4558F - Pt received => 2 anti-emetic agents (different classes) preop & intraop  ASA# 8 - Peds PONV Prevention: NA - Not pediatric patient, not GA or 2 or more risk factors NOT present  PQRS# 424 - Alea-op Temp Management: 4559F - At least one body temp DOCUMENTED => 35.5C or 95.9F within required timeframe  PQRS# 426 - PACU Transfer Protocol: - Transfer of care checklist used  ASA# 14 - Acute Post-op Pain: ASA14B - Patient did NOT experience pain >= 7 out of 10

## 2021-06-17 NOTE — OP NOTE
Name:  Addie Fox  PCP:  Delilah Tang MD  Procedure Date:  5/14/2021      EXCISION, MASS, TORSO (Left)    Pre-Procedure Diagnosis:  Lipoma of skin and subcutaneous tissue [D17.30]     Post-Procedure Diagnosis:    * No post-op diagnosis entered *    Surgeon(s):  Jerry Rodríguez DO    No Physician Assistant or First Assist has been documented in procedure    Anesthesia Type: Local MAC      Findings:  10 x 10 cm lipomatous mass  Prominent to the left costochondral margin    Operative Report:    The patient was taken the operating room and placed in a supine position.  The left lower chest and abdominal wall was prepped and draped sterile fashion.  I injected local anesthetic over the mass and made a 6 cm transverse incision.  I then used sharp and blunt dissection to dissect out a 10 cm x 10 cm lipomatous mass that had multiple projections.  This was eventually freed up and sent off the field as specimen.  The wound was then irrigated and closed with 0 Vicryl suture followed by 3-0 Vicryl suture and 4-0 Monocryl suture.  I then cleaned and covered the wound with Steri-Strips and dry sterile dressing the patient tolerated the procedure well.    Estimated Blood Loss:   * No blood loss documented between In Room and Out of Room log events - 5/14/2021 12:34 PM to 5/14/2021  1:10 PM *    Specimens:    [unfilled]       Drains:        Complications:    None    Jerry Rodríguez

## 2021-06-17 NOTE — ANESTHESIA POSTPROCEDURE EVALUATION
Patient: Addie Fox  Procedure(s):  EXCISION, MASS, TORSO (Left)  Anesthesia type: MAC    Patient location: Phase II Recovery  Last vitals:   Vitals Value Taken Time   /62 05/14/21 1330   Temp 36.3  C (97.3  F) 05/14/21 1313   Pulse 79 05/14/21 1331   Resp 16 05/14/21 1330   SpO2 96 % 05/14/21 1331   Vitals shown include unvalidated device data.  Post vital signs: stable  Level of consciousness: awake and responds to simple questions  Post-anesthesia pain: pain controlled  Post-anesthesia nausea and vomiting: no  Pulmonary: unassisted, return to baseline  Cardiovascular: stable and blood pressure at baseline  Hydration: adequate  Anesthetic events: no    QCDR Measures:  ASA# 11 - Alea-op Cardiac Arrest: ASA11B - Patient did NOT experience unanticipated cardiac arrest  ASA# 12 - Alea-op Mortality Rate: ASA12B - Patient did NOT die  ASA# 13 - PACU Re-Intubation Rate: NA - No ETT / LMA used for case  ASA# 10 - Composite Anes Safety: ASA10A - No serious adverse event    Additional Notes:

## 2021-06-17 NOTE — ANESTHESIA PREPROCEDURE EVALUATION
Anesthesia Evaluation      Patient summary reviewed   No history of anesthetic complications     Airway   Mallampati: II  Neck ROM: full   Pulmonary - negative ROS and normal exam    breath sounds clear to auscultation                         Cardiovascular - negative ROS and normal exam   Neuro/Psych - negative ROS   (+) depression, anxiety/panic attacks,     Endo/Other    (+) diabetes mellitus (GDM), obesity (bmi 37),      GI/Hepatic/Renal - negative ROS      Other findings: S/p hysterectomy        Dental - normal exam                        Anesthesia Plan  Planned anesthetic: MAC  Versed/fentanyl/propofol  Ketamine PRN  Decadron/zofran  GA PRN      ASA 2   Induction: intravenous   Anesthetic plan and risks discussed with: patient  Anesthesia plan special considerations: antiemetics,   Post-op plan: routine recovery      5/10/21 covid pcr negative    Results for orders placed or performed in visit on 04/21/21   Basic Metabolic Panel   Result Value Ref Range    Sodium 142 136 - 145 mmol/L    Potassium 3.9 3.5 - 5.0 mmol/L    Chloride 104 98 - 107 mmol/L    CO2 27 22 - 31 mmol/L    Anion Gap, Calculation 11 5 - 18 mmol/L    Glucose 98 70 - 125 mg/dL    Calcium 8.8 8.5 - 10.5 mg/dL    BUN 10 8 - 22 mg/dL    Creatinine 0.66 0.60 - 1.10 mg/dL    GFR MDRD Af Amer >60 >60 mL/min/1.73m2    GFR MDRD Non Af Amer >60 >60 mL/min/1.73m2   Lipid Cascade   Result Value Ref Range    Cholesterol 258 (H) <=199 mg/dL    Triglycerides 328 (H) <=149 mg/dL    HDL Cholesterol 37 (L) >=50 mg/dL    LDL Calculated 155 (H) <=129 mg/dL    Patient Fasting > 8hrs? Unknown

## 2021-06-17 NOTE — H&P
I have performed an assessment and examined the patient, as necessary, to update the patient's current status that may have changed since the prior History and Physical.  The History & Physical has been reviewed and no updates are needed.      Plan for excision of left lateral thoracic/abdominal wall mass    Shaheen Rodríguez [facility] Surgery  [phone]

## 2021-06-21 NOTE — LETTER
Letter by Olga Kim CMA at      Author: Olga Kim CMA Service: -- Author Type: --    Filed:  Encounter Date: 4/27/2021 Status: (Other)       We've received instruction to get you scheduled for Torso Mass Excison with Dr Rodríguez. We have that arranged as follows:     Surgery Date: 5/14/2021  Location: [address], [address] (3rd floor) [facility]  Arrival Time: 11:30 am (Unless instructed differently by the pre-op call nurse)     Prep Instructions:     1. Please schedule a pre-op physical with your primary care doctor. This may be virtual or face-to-face depending on your doctors preference. Call them right away to schedule this.    2. PCR-Rated COVID19 testing is required within 4 days of surgery. We have this scheduled for you at [facility], [address] [place] [address], 1st Floor on 5/10/2021 at 10:45 am. Follow the specific instructions you receive by Mariza. If your test is positive, your surgery will be canceled.     3. Nothing to eat or drink for 8 hours before surgery unless instructed differently by the pre-op nurse.    4. No blood thinners including aspirin for one week prior to surgery. Verify this is safe for you with your primary care doctor before stopping.     5. You will need an adult to drive you home and stay with you 24 hours after surgery.     6. At this time, no visitors are allowed at the facility; your  will not be able to wait for you.     7. When you arrive to the surgery center, you will be screened for COVID19 symptoms. If you screen positive, your surgery will need to be postponed for your safety.    8. If the community sees a new surge in COVID19 hospital admissions, your procedure may need to be postponed. We will contact you if this happens.    9. We always encourage you to notify your insurance any time you have something scheduled including surgery. The number is usually right on the back of your insurance card. Please call [facility]  [place] [facility] at [phone] for the surgeon fees, and [facility] Cost of Care [phone] for facility fees if you need pricing information.       Call our office if you have any questions! Thank you!

## 2021-06-25 NOTE — PROGRESS NOTES
HPI: Addie Fox is here for follow up after lipoma removal.  She is doing well with no complaints of pain and feels well.    Allergies, Medications, Social History, Past Medical History and Past Surgical History were reviewed and are noted in the chart.    There were no vitals taken for this visit.  There is no height or weight on file to calculate BMI.      EXAM:   GENERAL: Appears well  Abdomen-left upper abdominal wall incision well-healed    Incision 03/08/16 Abdomen (Active)       Incision 11/10/17 Perineum (Active)       Incision 11/10/17 Abdomen Bilateral (Active)       Incision 05/14/21 Surgical Abdomen Left (Active)   Site Assessment Covered, not assessed 05/14/21 1313   Surrounding Tissue Assessment Clean;Dry;Intact 05/14/21 1313   Closure Covered, not assessed 05/14/21 1313   Dressing Dry gauze 05/14/21 1313   Dressing Status  Clean;Dry;Intact 05/14/21 1313       Assessment/Plan: Addie Fox continues to do well. Her wound is healing and overall progressing well.  I discussed the pathology results with her.  She is doing well otherwise and has no complaints.  She may now follow-up as needed    Jerry Rodríguez DO [facility] Department of Surgery

## 2021-08-01 ENCOUNTER — HEALTH MAINTENANCE LETTER (OUTPATIENT)
Age: 36
End: 2021-08-01

## 2021-09-26 ENCOUNTER — HEALTH MAINTENANCE LETTER (OUTPATIENT)
Age: 36
End: 2021-09-26

## 2022-01-12 VITALS — BODY MASS INDEX: 37.29 KG/M2 | WEIGHT: 185 LBS | HEIGHT: 59 IN

## 2022-01-18 VITALS — WEIGHT: 193 LBS | BODY MASS INDEX: 38.91 KG/M2 | HEIGHT: 59 IN

## 2022-08-28 ENCOUNTER — HEALTH MAINTENANCE LETTER (OUTPATIENT)
Age: 37
End: 2022-08-28

## 2023-01-14 ENCOUNTER — HEALTH MAINTENANCE LETTER (OUTPATIENT)
Age: 38
End: 2023-01-14

## 2023-09-24 ENCOUNTER — HEALTH MAINTENANCE LETTER (OUTPATIENT)
Age: 38
End: 2023-09-24